# Patient Record
Sex: FEMALE | Race: WHITE | HISPANIC OR LATINO | Employment: PART TIME | URBAN - METROPOLITAN AREA
[De-identification: names, ages, dates, MRNs, and addresses within clinical notes are randomized per-mention and may not be internally consistent; named-entity substitution may affect disease eponyms.]

---

## 2017-09-21 ENCOUNTER — HOSPITAL ENCOUNTER (OUTPATIENT)
Dept: GENERAL RADIOLOGY | Facility: HOSPITAL | Age: 47
Discharge: HOME | End: 2017-09-21
Attending: FAMILY MEDICINE | Admitting: FAMILY MEDICINE

## 2017-09-21 ENCOUNTER — TRANSCRIBE ORDERS (OUTPATIENT)
Dept: ADMINISTRATIVE | Facility: HOSPITAL | Age: 47
End: 2017-09-21

## 2017-09-21 DIAGNOSIS — M51.06 LUMBAR DISC DISORDER WITH MYELOPATHY: Primary | ICD-10-CM

## 2017-09-21 DIAGNOSIS — M51.06 LUMBAR DISC DISORDER WITH MYELOPATHY: ICD-10-CM

## 2017-09-21 PROCEDURE — 72114 X-RAY EXAM L-S SPINE BENDING: CPT

## 2019-03-26 ENCOUNTER — TRANSCRIBE ORDERS (OUTPATIENT)
Dept: ADMINISTRATIVE | Facility: HOSPITAL | Age: 49
End: 2019-03-26

## 2019-03-26 ENCOUNTER — HOSPITAL ENCOUNTER (OUTPATIENT)
Dept: GENERAL RADIOLOGY | Facility: HOSPITAL | Age: 49
Discharge: HOME | End: 2019-03-26
Admitting: NURSE PRACTITIONER

## 2019-03-26 DIAGNOSIS — Z01.818 PRE-OPERATIVE CLEARANCE: ICD-10-CM

## 2019-03-26 DIAGNOSIS — Z01.818 PRE-OPERATIVE CLEARANCE: Primary | ICD-10-CM

## 2019-03-26 PROCEDURE — 71046 X-RAY EXAM CHEST 2 VIEWS: CPT

## 2020-01-13 ENCOUNTER — TRANSCRIBE ORDERS (OUTPATIENT)
Dept: ADMINISTRATIVE | Facility: HOSPITAL | Age: 50
End: 2020-01-13

## 2020-01-13 DIAGNOSIS — N63.0 BREAST LUMP IN FEMALE: Primary | ICD-10-CM

## 2020-02-07 ENCOUNTER — APPOINTMENT (OUTPATIENT)
Dept: MAMMOGRAPHY | Facility: HOSPITAL | Age: 50
End: 2020-02-07

## 2020-12-09 PROCEDURE — U0004 COV-19 TEST NON-CDC HGH THRU: HCPCS | Performed by: NURSE PRACTITIONER

## 2021-05-19 ENCOUNTER — TRANSCRIBE ORDERS (OUTPATIENT)
Dept: ADMINISTRATIVE | Facility: HOSPITAL | Age: 51
End: 2021-05-19

## 2021-05-19 DIAGNOSIS — R42 DIZZINESS: Primary | ICD-10-CM

## 2021-06-17 ENCOUNTER — HOSPITAL ENCOUNTER (OUTPATIENT)
Dept: MRI IMAGING | Facility: HOSPITAL | Age: 51
Discharge: HOME | End: 2021-06-17
Admitting: INTERNAL MEDICINE

## 2021-06-17 DIAGNOSIS — R42 DIZZINESS: ICD-10-CM

## 2021-06-17 PROCEDURE — 70551 MRI BRAIN STEM W/O DYE: CPT

## 2021-08-23 ENCOUNTER — TREATMENT (OUTPATIENT)
Dept: PHYSICAL THERAPY | Facility: CLINIC | Age: 51
End: 2021-08-23

## 2021-08-23 DIAGNOSIS — H83.2X1 VESTIBULAR HYPOFUNCTION OF RIGHT EAR: Primary | ICD-10-CM

## 2021-08-23 PROCEDURE — 97110 THERAPEUTIC EXERCISES: CPT | Performed by: PHYSICAL THERAPIST

## 2021-08-23 PROCEDURE — 97162 PT EVAL MOD COMPLEX 30 MIN: CPT | Performed by: PHYSICAL THERAPIST

## 2021-08-23 NOTE — PROGRESS NOTES
Physical Therapy Initial Evaluation and Plan of Care      Patient: Faith Lemus   : 1970  Diagnosis/ICD-10 Code:  Vestibular hypofunction of right ear [H83.2X1]  Referring practitioner: Francisco Bradley MD    Subjective Evaluation    History of Present Illness  Mechanism of injury: Patients states she has had issues with dizziness for 3 or so years.   Will come on randomly or sometimes when moving head. Worse when laying down. Will feel like she is woozy or drunk and occasionally nauseous. No associated headache. Can sometimes last for minutes, hours, or a full day. Denies spinning sensation.   Does get blurry vision, no double vision.     Does feel fullness in right ear. Has seen ENT. Not directly related to dizziness spells, always feels a little full.     Does have occasional neck pain and occasional cervicogenic headache. Seems muscle tension related. Does occasionally see chiropractor and massage.     No other major medical issues, does not take BP medication.         Patient Occupation:   Pain  No pain reported  Aggravating factors: sleeping    Hand dominance: right    Patient Goals  Patient goals for therapy: improved balance, return to sport/leisure activities and increased motion  Patient goal: exercise at gym            Objective          Functional Assessment     Comments  Vestibular testing    Cervical restriction: normal    Eye movement range: Normal      Smooth pursuit H-formation: saccades to right and right+down, sx onset     End point nystagmus: Normal     Gaze evoked nystagmus: normal       VOR:   VOR maintained fixation: onset with head down, mild saccade        VOR head thrust:  To Right: Norm   To Left: Norm    Head shake test: Neg     Head stable with torso movement: neg       Dynamic balance:    Static balance:  Feet together eyes open: 30 sec   Eyes closed:  Feet together: 30 sec  Tandem stance right font: 9 sec  Tandem stance left front: 20 sec  SLS right: 30  sec  SLS left: 14 sec       Walking balance  Walking with head up/down: sx onset, no misplaced steps   Walking with head left/right: Normal             Assessment & Plan     Assessment  Impairments: abnormal gait, impaired balance and lacks appropriate home exercise program  Assessment details: Patient is a 50 year old female presenting with dizziness, fullness in right ear, and balance deficits for several years with recent worsening of symptoms. Signs and symptoms are consistent with right vestibular hypofunction including saccades in right eyes when looking right and down, decreased balance and onset of symptoms with static balance with eyes closed, and onset of symptoms with head-eye movements. She may have some involvement from neck. Patient is appropriate for physical therapy for vestibular rehabilitation to improve these symptoms, decreased risk for falls, and be able to perform work activities.   Prognosis: good  Prognosis details: Short term goals(3 weeks):  Patient will be independent with initial habituation HEP.  Patient will demonstrate bilateral tandem of at least 15 seconds with eyes closed.   Patient will report reduction of frequency or intensity of dizziness by 50%.     Long term goals (8 weeks):  Patient will be independent with final HEP.   Patient will demonstrate bilateral SLS of 30 seconds with eyes closed.   Patient will report no loss of balance within at least 2 week period.   Functional Limitations: walking, moving in bed and standing  Plan  Therapy options: will be seen for skilled physical therapy services  Planned modality interventions: ultrasound, traction, thermotherapy (hydrocollator packs), TENS, high voltage pulsed current (spasm management), high voltage pulsed current (pain management), electrical stimulation/Russian stimulation and cryotherapy  Planned therapy interventions: therapeutic activities, stretching, strengthening, spinal/joint mobilization, soft tissue mobilization,  postural training, neuromuscular re-education, motor coordination training, manual therapy, abdominal trunk stabilization, ADL retraining, balance/weight-bearing training, body mechanics training, joint mobilization, IADL retraining, home exercise program, gait training, functional ROM exercises, flexibility and fine motor coordination training  Frequency: 1-2x/week.  Duration in visits: 12  Treatment plan discussed with: patient        Access Code: [email or web address]  URL: [email or web address]  Date: 08/23/2021  Prepared by: Naomi Banks    Exercises  Seated Gaze Stabilization with Head Rotation - 1 x daily - 7 x weekly - 10 reps  Seated Gaze Stabilization with Head Nod - 1 x daily - 7 x weekly - 10 reps  Seated Horizontal Smooth Pursuit - 1 x daily - 7 x weekly - 1 sets - 10 reps  Seated Gaze Stabilization with Head Rotation and Horizontal Arm Movement - 1 x daily - 7 x weekly - 1 sets - 10 reps  Standing Gaze Stabilization with Two Near Targets and Head Rotation - 1 x daily - 7 x weekly - 1 sets - 10 reps      Manual Therapy:         mins  08981;  Therapeutic Exercise:         mins  34686;     Neuromuscular Shawn:        mins  70900;    Therapeutic Activity:     20     mins  29888;     Gait Training:           mins  47191;     Ultrasound:          mins  73902;    Electrical Stimulation:         mins  09311 ( );  Dry Needling         mins self-pay    Timed Treatment:   20   mins   Total Treatment:     55   mins    PT SIGNATURE: Naomi Banks, PT   DATE TREATMENT INITIATED: 8/23/2021    Initial Certification  Certification Period: 11/21/2021  I certify that the therapy services are furnished while this patient is under my care.  The services outlined above are required by this patient, and will be reviewed every 90 days.     PHYSICIAN: Francisco Bradley MD      DATE:     Please sign and return via fax to [phone].. Thank you, [facility].

## 2021-08-27 ENCOUNTER — TREATMENT (OUTPATIENT)
Dept: PHYSICAL THERAPY | Facility: CLINIC | Age: 51
End: 2021-08-27

## 2021-08-27 DIAGNOSIS — H83.2X1 VESTIBULAR HYPOFUNCTION OF RIGHT EAR: Primary | ICD-10-CM

## 2021-08-27 PROCEDURE — 97530 THERAPEUTIC ACTIVITIES: CPT | Performed by: PHYSICAL THERAPIST

## 2021-08-27 NOTE — PROGRESS NOTES
Visit #: 2    Subjective   Faith Lemus reports: neck and back was sore same day as initial eval and she did have to take about a 2 hour nap. Has been performing HEP without issue. Able to practice gaze fixation between exercises that helps the dizziness.     Objective   Multiple LoB on tandem walk on beam, able to self correct.     Carioca without LoB       See Exercise, Manual, and Modality Logs for complete treatment.       Assessment/Plan  Will continue VOR retraining, balance training particularly in tandem stance.   Progress per Plan of Care           Manual Therapy:         mins  10304;  Therapeutic Exercise:         mins  95423;     Neuromuscular Shawn:        mins  27867;    Therapeutic Activity:     45     mins  95948;     Gait Training:           mins  81200;     Ultrasound:          mins  19000;   Iontophoresis          mins  14346   Electrical Stimulation:         mins  49766 ( );  Dry Needling          mins self-pay  Fluidotherapy         mins 67690    Timed Treatment:  45    mins   Total Treatment:     45   mins    Naomi Banks, PT  Physical Therapist

## 2021-08-30 ENCOUNTER — TREATMENT (OUTPATIENT)
Dept: PHYSICAL THERAPY | Facility: CLINIC | Age: 51
End: 2021-08-30

## 2021-08-30 DIAGNOSIS — H83.2X1 VESTIBULAR HYPOFUNCTION OF RIGHT EAR: Primary | ICD-10-CM

## 2021-08-30 PROCEDURE — 97110 THERAPEUTIC EXERCISES: CPT | Performed by: PHYSICAL THERAPIST

## 2021-08-30 PROCEDURE — 97530 THERAPEUTIC ACTIVITIES: CPT | Performed by: PHYSICAL THERAPIST

## 2021-08-30 NOTE — PROGRESS NOTES
Visit #: 3    Subjective   Faith Lemus reports: neck is feeling better. Dizziness is not as frequent and has not had nausea.     Objective   Mild saccade with downward gaze, improves with practice     See Exercise, Manual, and Modality Logs for complete treatment.       Assessment/Plan  Patient progressing well. Will continue 1-2 more visits and reassess. She is progressing to high level balance with VOR and smooth pursuit activity.   Progress per Plan of Care           Manual Therapy:         mins  16962;  Therapeutic Exercise:    15     mins  99733;     Neuromuscular Shawn:        mins  85022;    Therapeutic Activity:     45     mins  42752;     Gait Training:           mins  60340;     Ultrasound:          mins  94789;   Iontophoresis          mins  01765   Electrical Stimulation:        mins  24117 ( );  Dry Needling          mins self-pay  Fluidotherapy          mins 42528    Timed Treatment:   60   mins   Total Treatment:     60   mins    Naomi Banks, PT  Physical Therapist

## 2021-09-07 ENCOUNTER — TREATMENT (OUTPATIENT)
Dept: PHYSICAL THERAPY | Facility: CLINIC | Age: 51
End: 2021-09-07

## 2021-09-07 DIAGNOSIS — H83.2X1 VESTIBULAR HYPOFUNCTION OF RIGHT EAR: Primary | ICD-10-CM

## 2021-09-07 PROCEDURE — 97530 THERAPEUTIC ACTIVITIES: CPT | Performed by: PHYSICAL THERAPIST

## 2021-09-07 PROCEDURE — 97110 THERAPEUTIC EXERCISES: CPT | Performed by: PHYSICAL THERAPIST

## 2021-09-07 NOTE — PROGRESS NOTES
Visit #: 4    Subjective   Faith Lemus reports: had an episode of dizziness on Friday that lasted about 30 minutes. Has not had any blurry or double vision. Believes the amount of dizziness and episode is more frequent during her period which is next week and is curious to see how it will be now.     Objective   Very mild saccade in right eye to the right, no sign of lag or saccade downward.     See Exercise, Manual, and Modality Logs for complete treatment.       Assessment/Plan  Increase in symptoms during period could be reasonable due to hormonal changes or increased water retention.   Progress per Plan of Care  1 more visit and consider d/c with HEP          Manual Therapy:         mins  05420;  Therapeutic Exercise:    10     mins  89276;     Neuromuscular Shawn:        mins  84686;    Therapeutic Activity:     32     mins  52144;     Gait Training:           mins  61665;     Ultrasound:          mins  70968;   Iontophoresis          mins  01369   Electrical Stimulation:         mins  67328 ( );  Dry Needling          mins self-pay  Fluidotherapy          mins 67033    Timed Treatment:   42   mins   Total Treatment:     42   mins    Naomi Banks PT  Physical Therapist

## 2021-09-14 ENCOUNTER — TELEPHONE (OUTPATIENT)
Dept: PHYSICAL THERAPY | Facility: CLINIC | Age: 51
End: 2021-09-14

## 2021-12-02 ENCOUNTER — TRANSCRIBE ORDERS (OUTPATIENT)
Dept: ADMINISTRATIVE | Facility: HOSPITAL | Age: 51
End: 2021-12-02

## 2021-12-02 DIAGNOSIS — Z85.3 PERSONAL HISTORY OF BREAST CANCER: ICD-10-CM

## 2021-12-02 DIAGNOSIS — N64.4 BREAST PAIN: Primary | ICD-10-CM

## 2022-01-26 ENCOUNTER — HOSPITAL ENCOUNTER (OUTPATIENT)
Dept: MAMMOGRAPHY | Facility: HOSPITAL | Age: 52
Discharge: HOME | End: 2022-01-26

## 2022-01-26 ENCOUNTER — HOSPITAL ENCOUNTER (OUTPATIENT)
Dept: ULTRASOUND IMAGING | Facility: HOSPITAL | Age: 52
Discharge: HOME | End: 2022-01-26

## 2022-01-26 DIAGNOSIS — N64.4 BREAST PAIN: ICD-10-CM

## 2022-01-26 DIAGNOSIS — Z85.3 PERSONAL HISTORY OF BREAST CANCER: ICD-10-CM

## 2022-01-26 PROCEDURE — 77066 DX MAMMO INCL CAD BI: CPT

## 2022-01-26 PROCEDURE — 77066 DX MAMMO INCL CAD BI: CPT | Performed by: RADIOLOGY

## 2022-01-26 PROCEDURE — 77062 BREAST TOMOSYNTHESIS BI: CPT | Performed by: RADIOLOGY

## 2022-01-26 PROCEDURE — 76642 ULTRASOUND BREAST LIMITED: CPT | Performed by: RADIOLOGY

## 2022-01-26 PROCEDURE — G0279 TOMOSYNTHESIS, MAMMO: HCPCS

## 2022-01-26 PROCEDURE — 76642 ULTRASOUND BREAST LIMITED: CPT

## 2022-06-23 ENCOUNTER — TRANSCRIBE ORDERS (OUTPATIENT)
Dept: ADMINISTRATIVE | Facility: HOSPITAL | Age: 52
End: 2022-06-23

## 2022-07-27 ENCOUNTER — HOSPITAL ENCOUNTER (OUTPATIENT)
Dept: ULTRASOUND IMAGING | Facility: HOSPITAL | Age: 52
Discharge: HOME | End: 2022-07-27
Admitting: RADIOLOGY

## 2022-07-27 ENCOUNTER — TRANSCRIBE ORDERS (OUTPATIENT)
Dept: MAMMOGRAPHY | Facility: HOSPITAL | Age: 52
End: 2022-07-27

## 2022-07-27 DIAGNOSIS — R92.8 ABNORMAL ULTRASOUND OF BREAST: Primary | ICD-10-CM

## 2022-07-27 DIAGNOSIS — R92.8 ABNORMAL MAMMOGRAM: ICD-10-CM

## 2022-07-27 PROCEDURE — 76642 ULTRASOUND BREAST LIMITED: CPT

## 2022-07-27 PROCEDURE — 76642 ULTRASOUND BREAST LIMITED: CPT | Performed by: RADIOLOGY

## 2022-08-19 ENCOUNTER — TRANSCRIBE ORDERS (OUTPATIENT)
Dept: ADMINISTRATIVE | Facility: HOSPITAL | Age: 52
End: 2022-08-19

## 2022-08-19 DIAGNOSIS — R42 DIZZINESS: Primary | ICD-10-CM

## 2022-09-14 ENCOUNTER — HOSPITAL ENCOUNTER (OUTPATIENT)
Dept: CARDIOLOGY | Facility: HOSPITAL | Age: 52
Discharge: HOME | End: 2022-09-14
Admitting: INTERNAL MEDICINE

## 2022-09-14 DIAGNOSIS — R42 DIZZINESS: ICD-10-CM

## 2022-09-14 LAB
MAXIMAL PREDICTED HEART RATE: 170 BPM
STRESS TARGET HR: 145 BPM

## 2022-09-14 PROCEDURE — 93660 TILT TABLE EVALUATION: CPT | Performed by: INTERNAL MEDICINE

## 2022-09-14 PROCEDURE — 93660 TILT TABLE EVALUATION: CPT

## 2022-09-20 ENCOUNTER — HOSPITAL ENCOUNTER (OUTPATIENT)
Dept: CARDIOLOGY | Facility: HOSPITAL | Age: 52
Discharge: HOME | End: 2022-09-20
Admitting: INTERNAL MEDICINE

## 2022-09-20 VITALS
WEIGHT: 129 LBS | HEART RATE: 73 BPM | HEIGHT: 64 IN | SYSTOLIC BLOOD PRESSURE: 102 MMHG | BODY MASS INDEX: 22.02 KG/M2 | DIASTOLIC BLOOD PRESSURE: 78 MMHG

## 2022-09-20 DIAGNOSIS — R42 DIZZINESS: ICD-10-CM

## 2022-09-20 LAB
MAXIMAL PREDICTED HEART RATE: 170 BPM
STRESS TARGET HR: 145 BPM

## 2022-09-20 PROCEDURE — 93660 TILT TABLE EVALUATION: CPT | Performed by: INTERNAL MEDICINE

## 2022-09-20 PROCEDURE — 93660 TILT TABLE EVALUATION: CPT

## 2022-10-06 ENCOUNTER — HOSPITAL ENCOUNTER (OUTPATIENT)
Dept: GENERAL RADIOLOGY | Facility: HOSPITAL | Age: 52
Discharge: HOME | End: 2022-10-06
Admitting: PHYSICIAN ASSISTANT

## 2022-10-06 ENCOUNTER — TRANSCRIBE ORDERS (OUTPATIENT)
Dept: ADMINISTRATIVE | Facility: HOSPITAL | Age: 52
End: 2022-10-06

## 2022-10-06 DIAGNOSIS — S61.412A COMPLICATED LACERATION OF HAND, LEFT, INITIAL ENCOUNTER: ICD-10-CM

## 2022-10-06 DIAGNOSIS — S61.412A COMPLICATED LACERATION OF HAND, LEFT, INITIAL ENCOUNTER: Primary | ICD-10-CM

## 2022-10-06 PROCEDURE — 73130 X-RAY EXAM OF HAND: CPT

## 2023-04-26 ENCOUNTER — OFFICE VISIT (OUTPATIENT)
Dept: SLEEP MEDICINE | Facility: HOSPITAL | Age: 53
End: 2023-04-26
Payer: COMMERCIAL

## 2023-04-26 VITALS
SYSTOLIC BLOOD PRESSURE: 134 MMHG | HEART RATE: 77 BPM | WEIGHT: 127.8 LBS | BODY MASS INDEX: 21.82 KG/M2 | OXYGEN SATURATION: 98 % | DIASTOLIC BLOOD PRESSURE: 66 MMHG | HEIGHT: 64 IN

## 2023-04-26 DIAGNOSIS — F51.04 CHRONIC INSOMNIA: Primary | ICD-10-CM

## 2023-04-26 RX ORDER — MECLIZINE HYDROCHLORIDE 25 MG/1
25 TABLET ORAL 3 TIMES DAILY PRN
COMMUNITY

## 2023-04-26 RX ORDER — DESVENLAFAXINE 25 MG/1
TABLET, EXTENDED RELEASE ORAL
COMMUNITY

## 2023-04-26 RX ORDER — IBUPROFEN 400 MG/1
400 TABLET ORAL EVERY 6 HOURS PRN
COMMUNITY

## 2023-04-26 RX ORDER — CYCLOBENZAPRINE HCL 10 MG
TABLET ORAL
COMMUNITY

## 2023-04-26 RX ORDER — ALPRAZOLAM 0.25 MG/1
TABLET ORAL EVERY 8 HOURS SCHEDULED
COMMUNITY
Start: 2021-12-01

## 2023-04-26 NOTE — PROGRESS NOTES
"  Faith Sigala is a 52 y.o. female.   Chief Complaint   Patient presents with   • Sleeping Problem       HPI     52 y.o. female seen in consultation at the request of Baylee Luciano MD for evaluation of the above.     She describes problems with sleep for about 2 years.    She has difficulty with sleep initiation.  She typically will take up to 2 hours to get to sleep.  She then will sleep until around 3 or 4 AM and then awaken and then be awake for an hour and 1/2 to 2 hours again and then sleep for another hour and 1/2 to 2 hours.  She thinks she averages around 5 and half hours of sleep on average in total throughout the night.  She does not nap during the day.  Interestingly she says she is not sleepy during the day but does note fatigue.    These problems started happening around 2 years ago when she was going through marital difficulties with her .  They have undergone treatment and these issues are resolved but her sleep issues persist.    She also has had problems with dizziness/vertigo for which she has tried meclizine.  She also has an unusual \"buzzing\" in her ears and she has seen Dr. De La Vega.  An MRI has been ordered.    She has tried various medications.  Meclizine for her dizziness but this was thought to possibly help with sleep as well.  It did not.  She has tried trazodone, hydroxyzine, and amitriptyline.  This did not help her sleep and had significant side effects such as nightmares.  Ambien also caused significant nightmares.    She takes a very low-dose of Xanax at night 0.25 or at the most 0.5 mg for her ear symptoms but says this does help her sleep but she is not interested in taking this long-term.    [place] Scale is: 0/24    The patient's relevant past medical, surgical, family, and social history reviewed and updated in Epic as appropriate.    Current medications are:   Current Outpatient Medications:   •  ALPRAZolam (XANAX) 0.25 MG tablet, Every 8 (Eight) Hours., Disp: , " "Rfl:   •  cyclobenzaprine (FLEXERIL) 10 MG tablet, cyclobenzaprine 10 mg tablet, Disp: , Rfl:   •  Desvenlafaxine Succinate ER 25 MG tablet sustained-release 24 hour, , Disp: , Rfl:   •  ibuprofen (ADVIL,MOTRIN) 400 MG tablet, Take 1 tablet by mouth Every 6 (Six) Hours As Needed for Mild Pain., Disp: , Rfl:   •  meclizine (ANTIVERT) 25 MG tablet, Take 1 tablet by mouth 3 (Three) Times a Day As Needed for Dizziness., Disp: , Rfl: .    Review of Systems    Review of Systems  ROS documented in patient questionnaire ×14 systems.  Reviewed with patient.  Otherwise negative except as noted in HPI.    Physical Exam    Blood pressure 134/66, pulse 77, height 162.6 cm (64\"), weight 58 kg (127 lb 12.8 oz), SpO2 98 %. Body mass index is 21.94 kg/m².    Physical Exam  Vitals and nursing note reviewed.   Constitutional:       Appearance: Normal appearance. She is well-developed.   HENT:      Head: Normocephalic and atraumatic.      Nose: Nose normal.      Mouth/Throat:      Mouth: Mucous membranes are moist.      Pharynx: Oropharynx is clear. No oropharyngeal exudate.      Comments: Class II airway  Eyes:      General: No scleral icterus.     Conjunctiva/sclera: Conjunctivae normal.   Neck:      Thyroid: No thyromegaly.      Trachea: No tracheal deviation.   Cardiovascular:      Rate and Rhythm: Normal rate and regular rhythm.      Heart sounds: No murmur heard.    No friction rub. No gallop.   Pulmonary:      Effort: Pulmonary effort is normal. No respiratory distress.      Breath sounds: No wheezing or rales.   Musculoskeletal:         General: No deformity. Normal range of motion.   Skin:     General: Skin is warm and dry.      Findings: No rash.   Neurological:      Mental Status: She is alert and oriented to person, place, and time.   Psychiatric:         Behavior: Behavior normal.         Thought Content: Thought content normal.         DATA:    Reviewed recent note from Dr. De La Vega dated 9/9/2022    Reviewed most recent " note from Dr. Luciano dated 1/28/2023    Obtained an independent history from her  via a bed partner questionnaire    ASSESSMENT:    Problem List Items Addressed This Visit     Chronic insomnia - Primary       52-year-old female with chronic insomnia that has been present for about 2 years.  This seemed to develop around the time she was having marital difficulties.  These difficulties have been addressed and are resolved.  She has ongoing problems with sleep initiation and maintenance.  Details are as above.  Multiple medications have either been ineffective or caused undue side effects.  Low-dose Xanax is partially effective.  She takes this for her ear symptoms of tinnitus and vertigo but does not want to be on this long-term.    I went through sleep hygiene, stimulus control therapy, and CBT with her in brief.  It is clear that there can be significant improvements made nonpharmacologically.    PLAN:    1. I went through sleep hygiene with her both verbally and gave her written instructions  2. Discussed stimulus control therapy  3. Gave her information on online cognitive behavioral therapy for insomnia via [facility].  Offered in person treatment here in [place] with Dr. Tan if she desires.  4. No additional pharmacologic therapy at this point given her lack of response and side effects and I would like to pursue nonpharmacologic means initially at any rate  5. Continued follow-up in the sleep center with me    I have reviewed the results of my evaluation and impression and discussed my recommendations in detail with the patient.    Signed by  Rocco Naqvi MD    April 26, 2023      CC: Baylee Luciano MD Rollins, Ashley, MD

## 2023-06-05 ENCOUNTER — HOSPITAL ENCOUNTER (OUTPATIENT)
Dept: ULTRASOUND IMAGING | Facility: HOSPITAL | Age: 53
Discharge: HOME | End: 2023-06-05
Admitting: NURSE PRACTITIONER
Payer: COMMERCIAL

## 2023-06-05 DIAGNOSIS — R92.8 ABNORMAL ULTRASOUND OF BREAST: ICD-10-CM

## 2023-06-05 PROCEDURE — 76642 ULTRASOUND BREAST LIMITED: CPT | Performed by: RADIOLOGY

## 2023-06-05 PROCEDURE — 76642 ULTRASOUND BREAST LIMITED: CPT

## 2023-08-28 ENCOUNTER — HOSPITAL ENCOUNTER (OUTPATIENT)
Dept: CT IMAGING | Facility: HOSPITAL | Age: 53
Discharge: HOME | End: 2023-08-28
Admitting: INTERNAL MEDICINE
Payer: COMMERCIAL

## 2023-08-28 ENCOUNTER — TRANSCRIBE ORDERS (OUTPATIENT)
Dept: ADMINISTRATIVE | Facility: HOSPITAL | Age: 53
End: 2023-08-28
Payer: COMMERCIAL

## 2023-08-28 DIAGNOSIS — R20.0 FACIAL NUMBNESS: ICD-10-CM

## 2023-08-28 DIAGNOSIS — R20.0 FACIAL NUMBNESS: Primary | ICD-10-CM

## 2023-08-28 PROCEDURE — 70450 CT HEAD/BRAIN W/O DYE: CPT

## 2024-04-22 ENCOUNTER — TRANSCRIBE ORDERS (OUTPATIENT)
Dept: ADMINISTRATIVE | Facility: HOSPITAL | Age: 54
End: 2024-04-22
Payer: COMMERCIAL

## 2024-04-22 DIAGNOSIS — G57.93 NEUROPATHY INVOLVING BOTH LOWER EXTREMITIES: Primary | ICD-10-CM

## 2024-08-22 ENCOUNTER — TRANSCRIBE ORDERS (OUTPATIENT)
Dept: ADMINISTRATIVE | Facility: HOSPITAL | Age: 54
End: 2024-08-22
Payer: COMMERCIAL

## 2024-08-22 DIAGNOSIS — R10.9 ABDOMINAL PAIN, UNSPECIFIED ABDOMINAL LOCATION: Primary | ICD-10-CM

## 2024-08-23 ENCOUNTER — HOSPITAL ENCOUNTER (OUTPATIENT)
Facility: HOSPITAL | Age: 54
Discharge: HOME | End: 2024-08-23
Admitting: INTERNAL MEDICINE
Payer: COMMERCIAL

## 2024-08-23 DIAGNOSIS — R10.9 ABDOMINAL PAIN, UNSPECIFIED ABDOMINAL LOCATION: ICD-10-CM

## 2024-08-23 PROCEDURE — 74176 CT ABD & PELVIS W/O CONTRAST: CPT

## 2024-08-23 PROCEDURE — 0 DIATRIZOATE MEGLUMINE & SODIUM PER 1 ML: Performed by: INTERNAL MEDICINE

## 2024-09-10 ENCOUNTER — TRANSCRIBE ORDERS (OUTPATIENT)
Dept: ADMINISTRATIVE | Facility: HOSPITAL | Age: 54
End: 2024-09-10
Payer: COMMERCIAL

## 2024-10-30 ENCOUNTER — TRANSCRIBE ORDERS (OUTPATIENT)
Dept: ADMINISTRATIVE | Facility: HOSPITAL | Age: 54
End: 2024-10-30
Payer: COMMERCIAL

## 2024-10-30 DIAGNOSIS — Z12.31 ENCOUNTER FOR SCREENING MAMMOGRAM FOR MALIGNANT NEOPLASM OF BREAST: Primary | ICD-10-CM

## 2024-11-01 ENCOUNTER — TRANSCRIBE ORDERS (OUTPATIENT)
Dept: ADMINISTRATIVE | Facility: HOSPITAL | Age: 54
End: 2024-11-01
Payer: COMMERCIAL

## 2024-11-01 DIAGNOSIS — R92.8 ABNORMAL FINDING ON BREAST IMAGING: Primary | ICD-10-CM

## 2024-11-14 ENCOUNTER — HOSPITAL ENCOUNTER (OUTPATIENT)
Facility: HOSPITAL | Age: 54
Discharge: HOME | End: 2024-11-14
Admitting: NURSE PRACTITIONER
Payer: COMMERCIAL

## 2024-11-14 DIAGNOSIS — R92.8 ABNORMAL FINDING ON BREAST IMAGING: ICD-10-CM

## 2024-11-14 PROCEDURE — 76642 ULTRASOUND BREAST LIMITED: CPT

## 2024-11-15 ENCOUNTER — TRANSCRIBE ORDERS (OUTPATIENT)
Dept: ADMINISTRATIVE | Facility: HOSPITAL | Age: 54
End: 2024-11-15
Payer: COMMERCIAL

## 2024-11-15 DIAGNOSIS — R92.8 ABNORMAL FINDING ON BREAST IMAGING: Primary | ICD-10-CM
